# Patient Record
Sex: MALE | Race: WHITE | NOT HISPANIC OR LATINO
[De-identification: names, ages, dates, MRNs, and addresses within clinical notes are randomized per-mention and may not be internally consistent; named-entity substitution may affect disease eponyms.]

---

## 2019-11-20 ENCOUNTER — APPOINTMENT (OUTPATIENT)
Dept: NEUROSURGERY | Facility: CLINIC | Age: 34
End: 2019-11-20
Payer: COMMERCIAL

## 2019-11-20 VITALS
WEIGHT: 170 LBS | RESPIRATION RATE: 18 BRPM | HEIGHT: 67 IN | SYSTOLIC BLOOD PRESSURE: 142 MMHG | DIASTOLIC BLOOD PRESSURE: 79 MMHG | OXYGEN SATURATION: 99 % | HEART RATE: 110 BPM | BODY MASS INDEX: 26.68 KG/M2

## 2019-11-20 DIAGNOSIS — Z78.9 OTHER SPECIFIED HEALTH STATUS: ICD-10-CM

## 2019-11-20 DIAGNOSIS — Z80.9 FAMILY HISTORY OF MALIGNANT NEOPLASM, UNSPECIFIED: ICD-10-CM

## 2019-11-20 PROBLEM — Z00.00 ENCOUNTER FOR PREVENTIVE HEALTH EXAMINATION: Status: ACTIVE | Noted: 2019-11-20

## 2019-11-20 PROCEDURE — 99205 OFFICE O/P NEW HI 60 MIN: CPT

## 2019-11-20 NOTE — HISTORY OF PRESENT ILLNESS
[de-identified] : RIGHT-HANDED never smoker with no pertinent medical history presents to the office, referred by Dr. Nicholas Marie, to evaluate left cheek lesion initially found in 2007 managed by Dr. Vitor Frazier with Prednisone which eliminated the lesion. The lesion resurfaced in August 2019, saw Dr. Marie, who put him back on Prednisone with little effect on the size of the lesion this time. He does not notice a significant size in the lesion on Valsalva maneuver. Denies pain or bleeding at the site.\par \par MRI orbit/face from 10/29/19 reports a 5.3 x 4.6 x 2.4 cm enhancing multilobulated subcutaneous signal abnormality involving the medial LEFT cheek, compatible with lymphatic or venous malformation.\par \par \par \par Handedness: RIGHT\par \par Patient Address:\par [address] [place] [address], [facility] [address]\[place] [address]\par \par Referring MD:\par Dr. Nicholas Marie\par [address]\[place] [address]\par [phone]\par \par PCP:\par Dr. Perry Klein\par [address] [facility], [address]\[place] [address]\par [phone]

## 2019-11-20 NOTE — PHYSICAL EXAM
[General Appearance - Alert] : alert [Oriented To Time, Place, And Person] : oriented to person, place, and time [Impaired Insight] : insight and judgment were intact [Respiration, Rhythm And Depth] : normal respiratory rhythm and effort [] : no respiratory distress [Apical Impulse] : the apical impulse was normal [Heart Rate And Rhythm] : heart rate was normal and rhythm regular [Abnormal Walk] : normal gait [FreeTextEntry1] : LEFT cheek lesion, soft to touch. No bleeding

## 2019-11-20 NOTE — REASON FOR VISIT
[New Patient Visit] : a new patient visit [Referred By: _________] : Patient was referred by FAHAD [FreeTextEntry1] : to evaluate possible lymphatic/venous malformation in the left cheek region

## 2019-11-20 NOTE — END OF VISIT
[FreeTextEntry3] : Written by Oksana Hatfield NP acting as a scribe for Dr. Arias Torres MD.  The documentation recorded by the scribe accurately reflects the service I personally performed and the decisions made by me, Dr. Arias Torres \par \par

## 2019-11-20 NOTE — PLAN
[FreeTextEntry1] : Plan:\par 1. Sclerotherapy with Bleomycin of venous malformation on the left cheek region scheduled on 11/27/19. Advised patient to obtain a PCP clearance and pulmonary function test prior to the procedure. He will call Dr. Sevilla's office to schedule both.

## 2019-12-04 ENCOUNTER — APPOINTMENT (OUTPATIENT)
Dept: HEART AND VASCULAR | Facility: CLINIC | Age: 34
End: 2019-12-04
Payer: COMMERCIAL

## 2019-12-04 VITALS
WEIGHT: 175 LBS | TEMPERATURE: 98.9 F | DIASTOLIC BLOOD PRESSURE: 68 MMHG | HEART RATE: 89 BPM | BODY MASS INDEX: 27.47 KG/M2 | OXYGEN SATURATION: 99 % | SYSTOLIC BLOOD PRESSURE: 110 MMHG | HEIGHT: 67 IN

## 2019-12-04 DIAGNOSIS — Z78.9 OTHER SPECIFIED HEALTH STATUS: ICD-10-CM

## 2019-12-04 DIAGNOSIS — Z01.818 ENCOUNTER FOR OTHER PREPROCEDURAL EXAMINATION: ICD-10-CM

## 2019-12-04 PROCEDURE — 99204 OFFICE O/P NEW MOD 45 MIN: CPT

## 2019-12-04 PROCEDURE — 93000 ELECTROCARDIOGRAM COMPLETE: CPT

## 2019-12-04 RX ORDER — PSYLLIUM HUSK 0.4 G
CAPSULE ORAL
Refills: 0 | Status: ACTIVE | COMMUNITY

## 2019-12-04 NOTE — HISTORY OF PRESENT ILLNESS
[Preoperative Visit] : for a medical evaluation prior to surgery [Scheduled Procedure ___] : a [unfilled] [Date of Surgery ___] : on [unfilled] [Surgeon Name ___] : surgeon: [unfilled] [Electrocardiogram] : ~T an ECG ~C was performed [Good] : Good [Prior Anesthesia] : Prior anesthesia [Steroid Use in Last 6 Months] : steroid use in the last six months [Fever] : no fever [Chills] : no chills [Fatigue] : no fatigue [Chest Pain] : no chest pain [Dyspnea] : no dyspnea [Cough] : no cough [Nausea] : no nausea [Vomiting] : no vomiting [Easy Bruising] : no easy bruising [Poor Exercise Tolerance] : no poor exercise tolerance [Lower Extremity Swelling] : no lower extremity swelling [Cardiovascular Disease] : no cardiovascular disease [Pulmonary Disease] : no pulmonary disease [Diabetes] : no diabetes [Nicotine Dependence] : no nicotine dependence [Alcohol Use] : no  alcohol use [Anti-Platelet Agents] : no anti-platelet agents [GI Disease] : no gastrointestinal disease [Renal Disease] : no renal disease [Thromboembolic Problems] : no thromboembolic problems [Frequent use of NSAIDs] : no use of NSAIDs [Impaired Immunity] : no impaired immunity [Transfusion Reaction] : no transfusion reaction [Prev Anesthesia Reaction] : no previous anesthesia reaction [Anesthesia Reaction] : no anesthesia reaction [Sudden Death] : no sudden death [Bleeding Disorder] : no bleeding disorder [Clotting Disorder] : no clotting disorder [de-identified] : Tel: [phone] [de-identified] : EKG NSR [FreeTextEntry1] : left facial venous/lymphatic malformation.  first noticed in 2007. non tender, no bleeding, no erythema  Drained by ent at the time which made it worse.  Was also put on prednisone at the time which did improve the results.   It had been absent up until ~ 1 month ago.  Took another course of prednisone which ended about a week ago ( ~ 2 week course).  No significant improvement.  MRI showed venous/lymphatic malformation.  \par Exercise tolerance good.  No chest pain or SOB.  \par \par PCP:  Perry Klein, [facility]\par Pulmonary: Dr. Campbell, baseline PFT's given the bleomycin. \par \par Meds: fish oil\par Non smoker\par Social EtOH \par FHX: Grandfather CAD, Aunt Lung cancer

## 2019-12-04 NOTE — DISCUSSION/SUMMARY
[Patient Low Risk] : the patient is a low surgical risk [Optimized for Surgery] : the patient is optimized for surgery [As per surgery] : as per surgery [Continue] : Continue medications as currently directed [Procedure Intermediate Risk] : the procedure risk is intermediate [FreeTextEntry3] : takes fish oil supplements.  completed prednisone taper.  [FreeTextEntry1] : 34 M with no significant PMH, here for preop clearance prior to sclerotherapy for facial venous/lymphatic malformation\par \par RCRI Score 0\par Good functional status\par EKG NSR, no ischemic changes\par No signs/symptoms of ischemia or heart failure. \par No further cardiac testing needed. \par Seen by pulmonary for baseline PFT's prior to low dose bleomycin therapy.\par

## 2019-12-06 ENCOUNTER — OUTPATIENT (OUTPATIENT)
Dept: OUTPATIENT SERVICES | Facility: HOSPITAL | Age: 34
LOS: 1 days | Discharge: HOME | End: 2019-12-06

## 2019-12-06 ENCOUNTER — TRANSCRIPTION ENCOUNTER (OUTPATIENT)
Age: 34
End: 2019-12-06

## 2019-12-06 ENCOUNTER — INPATIENT (INPATIENT)
Facility: HOSPITAL | Age: 34
LOS: 0 days | Discharge: HOME | DRG: 301 | End: 2019-12-07
Attending: RADIOLOGY | Admitting: RADIOLOGY
Payer: COMMERCIAL

## 2019-12-06 VITALS
HEART RATE: 93 BPM | TEMPERATURE: 99 F | RESPIRATION RATE: 16 BRPM | SYSTOLIC BLOOD PRESSURE: 109 MMHG | DIASTOLIC BLOOD PRESSURE: 54 MMHG

## 2019-12-06 DIAGNOSIS — Q27.9 CONGENITAL MALFORMATION OF PERIPHERAL VASCULAR SYSTEM, UNSPECIFIED: ICD-10-CM

## 2019-12-06 PROCEDURE — 37241 VASC EMBOLIZE/OCCLUDE VENOUS: CPT

## 2019-12-06 PROCEDURE — 99222 1ST HOSP IP/OBS MODERATE 55: CPT

## 2019-12-06 RX ORDER — CHLORHEXIDINE GLUCONATE 213 G/1000ML
1 SOLUTION TOPICAL ONCE
Refills: 0 | Status: DISCONTINUED | OUTPATIENT
Start: 2019-12-06 | End: 2019-12-07

## 2019-12-06 RX ORDER — SODIUM CHLORIDE 9 MG/ML
1000 INJECTION INTRAMUSCULAR; INTRAVENOUS; SUBCUTANEOUS
Refills: 0 | Status: DISCONTINUED | OUTPATIENT
Start: 2019-12-06 | End: 2019-12-06

## 2019-12-06 RX ORDER — BLEOMYCIN SULFATE 30 UNIT
15 VIAL (EA) INJECTION ONCE
Refills: 0 | Status: DISCONTINUED | OUTPATIENT
Start: 2019-12-06 | End: 2019-12-07

## 2019-12-06 RX ORDER — ACETAMINOPHEN 500 MG
650 TABLET ORAL EVERY 6 HOURS
Refills: 0 | Status: DISCONTINUED | OUTPATIENT
Start: 2019-12-06 | End: 2019-12-07

## 2019-12-06 RX ADMIN — SODIUM CHLORIDE 75 MILLILITER(S): 9 INJECTION INTRAMUSCULAR; INTRAVENOUS; SUBCUTANEOUS at 13:48

## 2019-12-06 NOTE — PROGRESS NOTE ADULT - SUBJECTIVE AND OBJECTIVE BOX
Surgery: Endovascular microcatheter sclerotherapy of L cheek venous malformation with bleomycin    Consent: Signed by patient     HPI:  35 yo male presents today for elective sclerotherapy treatment of L cheek venous malformation. Patient reports that the vascular anomaly was first discovered in 2007 when he noticed swelling of his L cheek. He underwent a biopsy and started a course of steroids, resulting in a decrease in size of the vascular malformation. Patient reports that a few weeks ago, he noticed an increase in the size of the malformation due to L cheek swelling. He started another course of steroids and finished it last week, with little to no improvement in the swelling. He denies any history of symptoms such as itching, vision changes, changes in hearing/taste/smell, HA, weakness of lower extremities, numbness/tingling of extremities, urinary/bowel incontinence.     PMH:  no significant PMH    Surgical history:  [place] tooth extraction    Medications:  none    No Known Allergies      EXAM:  AAO x 3, NAD  L cheek swelling  Speech clear, follows commands  CN II-XII intact, PERRL, EOMI, face symmetric, tongue midline  PENA, b/l UE/LE strength 5/5  Sensation intact to light touch  No pronator drift  b/l DP/PT pulses 3+      Type & Screen (in past 72hrs): N/A    CXR: none  EKG: sinus rhythm (12/4/19)      Last dose of antiplatelet/anticoagulation drug: none      Assessment: 35 yo male with L cheek vascular malformation    Plan: NEUROENDOVASCULAR PRE-OPERATIVE NOTE    Surgery: Endovascular microcatheter sclerotherapy of L cheek venous malformation with bleomycin    Consent: Signed by patient     HPI:  35 yo male presents today for elective sclerotherapy treatment of L cheek venous malformation. Patient reports that the vascular anomaly was first discovered in 2007 when he noticed swelling of his L cheek. He underwent a biopsy and started a course of steroids, resulting in a decrease in size of the vascular malformation. Patient reports that a few weeks ago, he noticed an increase in the size of the malformation due to L cheek swelling. He started another course of steroids and finished it last week, with little to no improvement in the swelling. He denies any history of symptoms such as itching, vision changes, changes in hearing/taste/smell, HA, weakness of lower extremities, numbness/tingling of extremities, urinary/bowel incontinence.     PMH:  no significant PMH    Surgical history:  [place] tooth extraction    Medications:  none    No Known Allergies      EXAM:  AAO x 3, NAD  L cheek swelling  Speech clear, follows commands  CN II-XII intact, PERRL, EOMI, face symmetric, tongue midline  PENA, b/l UE/LE strength 5/5  Sensation intact to light touch  No pronator drift  b/l DP/PT pulses 3+      Type & Screen (in past 72hrs): N/A    CXR: none  EKG: sinus rhythm (12/4/19)      Last dose of antiplatelet/anticoagulation drug: none      Assessment: 35 yo male with L cheek vascular malformation    Plan:  Plan for sclerotherapy L cheek vascular malformation with bleomycin  Consent in chart, discussed all risks/benefits/alternatives with patient.  Medical clearance reviewed, medically optimized  Plan for discharge tomorrow   D/w Dr. Torres

## 2019-12-06 NOTE — BRIEF OPERATIVE NOTE - OPERATION/FINDINGS
Endovascular microcatheter sclerotherapy using 6mL of Bleomycin for left cheek venous malformation performed under general sedation. Patient tolerated procedure well, extubated, and remains neurologically and hemodynamically stable.    Full report to follow, d/w Dr. Torres

## 2019-12-06 NOTE — PROGRESS NOTE ADULT - SUBJECTIVE AND OBJECTIVE BOX
Pt was examined at the bedside, pain is well controlled, pt denies sob, cp, difficulty swallowing.    12/6 POD#0 s/p Sclerotherapy with Bleomycin  of low flow vascular malformation  Left buccal area    ICU Vital Signs Last 24 Hrs  T(C): 37.1 (06 Dec 2019 12:34), Max: 37.1 (06 Dec 2019 12:34)  T(F): 98.7 (06 Dec 2019 12:34), Max: 98.7 (06 Dec 2019 12:34)  HR: 82 (06 Dec 2019 15:15) (80 - 94)  BP: 108/54 (06 Dec 2019 15:15) (95/55 - 118/64)  BP(mean): 94 (06 Dec 2019 14:35) (68 - 94)  ABP: --  ABP(mean): --  RR: 20 (06 Dec 2019 15:15) (10 - 20)  SpO2: 97% (06 Dec 2019 15:15) (96% - 99%)    Exam:  A&OX3, NAD, clear coherent speech  CNs II-XII grossly intact  Left buccal area edematous   motor 5/5 x 4 extr, no drift,   sensation to LT grossly intact throughout      A/P  Pt is 33 yo male s/p Sclerotherapy with Bleomycin  of low flow vascular malformation  Left buccal area POD#0 (12/6)  - Bleomycin protocol for 48hrs, nurse was instructed  - OOB  - Advance diet as tolerated  - Pain Meds PRN  - SCDs, IS  - Tentatively scheduled for discharge home tomorrow  - D/w Dr. Torres 5 yo male presents today for elective sclerotherapy treatment of L cheek venous malformation with Bleomycin. Patient reports that the vascular anomaly was first discovered in 2007 when he noticed swelling of his L cheek. He underwent a biopsy and started a course of steroids, resulting in a decrease in size of the vascular malformation. Patient reports that a few weeks ago, he noticed an increase in the size of the malformation due to L cheek swelling. He started another course of steroids and finished it last week, with little to no improvement in the swelling. He denies any history of symptoms such as itching, vision changes, changes in hearing/taste/smell, HA, weakness of lower extremities, numbness/tingling of extremities, urinary/bowel incontinence.       Pt was examined at the bedside, pain is well controlled, pt denies sob, cp, difficulty swallowing.    12/6 POD#0 s/p Sclerotherapy with Bleomycin  of low flow vascular malformation  Left buccal area    ICU Vital Signs Last 24 Hrs  T(C): 37.1 (06 Dec 2019 12:34), Max: 37.1 (06 Dec 2019 12:34)  T(F): 98.7 (06 Dec 2019 12:34), Max: 98.7 (06 Dec 2019 12:34)  HR: 82 (06 Dec 2019 15:15) (80 - 94)  BP: 108/54 (06 Dec 2019 15:15) (95/55 - 118/64)  BP(mean): 94 (06 Dec 2019 14:35) (68 - 94)  ABP: --  ABP(mean): --  RR: 20 (06 Dec 2019 15:15) (10 - 20)  SpO2: 97% (06 Dec 2019 15:15) (96% - 99%)    Exam:  A&OX3, NAD, clear coherent speech  CNs II-XII grossly intact  Left buccal area edematous   motor 5/5 x 4 extr, no drift,   sensation to LT grossly intact throughout      A/P  Pt is 35 yo male s/p Sclerotherapy with Bleomycin  of low flow vascular malformation  Left buccal area POD#0 (12/6)  - Bleomycin protocol for 48hrs, nurse was instructed  - OOB  - Advance diet as tolerated  - Pain Meds PRN  - SCDs, IS  - Tentatively scheduled for discharge home tomorrow  - D/w Dr. Torres

## 2019-12-06 NOTE — H&P ADULT - PROBLEM SELECTOR PLAN 1
Plan for sclerotherapy L cheek vascular malformation with bleomycin  Consent in chart, discussed all risks/benefits/alternatives with patient.  Medical clearance reviewed, medically optimized  Plan for discharge tomorrow   D/w Dr. Torres

## 2019-12-06 NOTE — H&P ADULT - HISTORY OF PRESENT ILLNESS
35 yo male presents today for elective sclerotherapy treatment of L cheek venous malformation with Bleomycin. Patient reports that the vascular anomaly was first discovered in 2007 when he noticed swelling of his L cheek. He underwent a biopsy and started a course of steroids, resulting in a decrease in size of the vascular malformation. Patient reports that a few weeks ago, he noticed an increase in the size of the malformation due to L cheek swelling. He started another course of steroids and finished it last week, with little to no improvement in the swelling. He denies any history of symptoms such as itching, vision changes, changes in hearing/taste/smell, HA, weakness of lower extremities, numbness/tingling of extremities, urinary/bowel incontinence.

## 2019-12-06 NOTE — H&P ADULT - ASSESSMENT
33 yo male with L cheek vascular malformation 35 yo male with L cheek vascular malformation known since 2007 with prior resolution with steroid therapy  now with recurrence, asymptomatic.

## 2019-12-06 NOTE — CHART NOTE - NSCHARTNOTEFT_GEN_A_CORE
Neurosurgical Indications for Screening Dopplers on Admission:       1) Known hypercoagulation disorder (h/o VTE, thrombophilia, HIT, etc.)   2) Admitted from prolonged stay from another institution (straight forward ER transfers not included)  3) Presenting with significant leg immobility   4) Presenting with signs and symptoms of VTE?    5) With significant critical illness, Including "found down" for unknown period of time in HPI  6) With significant neurotrauma (TBI, SCI / TLS spine fractures)   7) Who are comatose   8) With known malignancy (e.g. glioblastoma multiforme, meningioma, etc.). Excludes IA chemo 23hr observation stays  9) On hemodialysis   10) Who have received platelet transfusion or antithrombotic reversal agents recently   11) Who have had recent major orthopedic surgery          Screening dopplers indicated?   Y _   N x    DVT Prophylaxis:  x SCD's   _ chemoprophylaxis

## 2019-12-06 NOTE — PACU DISCHARGE NOTE - COMMENTS
Patient meets criteria for patient discharge, VSS, #20 gauge IV to left A/C patent and intact receiving 0.9%NS @ 75ml/hr, Patient drinking water and eating crackers with no nausea, moving all extremities with normal strength, No Pain reported, No SOB, No chest pain, skin intact, Left cheek swollen with pink/red color, Report given to nathaniel on 8 [facility], patient transported via bed on monitor.

## 2019-12-06 NOTE — H&P ADULT - NSHPPHYSICALEXAM_GEN_ALL_CORE
AAO x 3, NAD  L cheek swelling  Speech clear, follows commands  CN II-XII intact, PERRL, EOMI, face symmetric, tongue midline  PENA, b/l UE/LE strength 5/5  Sensation intact to light touch  No pronator drift  b/l DP/PT pulses 3+

## 2019-12-07 ENCOUNTER — TRANSCRIPTION ENCOUNTER (OUTPATIENT)
Age: 34
End: 2019-12-07

## 2019-12-07 VITALS — TEMPERATURE: 98 F

## 2019-12-07 PROCEDURE — 99238 HOSP IP/OBS DSCHRG MGMT 30/<: CPT

## 2019-12-07 RX ORDER — ACETAMINOPHEN 500 MG
2 TABLET ORAL
Qty: 0 | Refills: 0 | DISCHARGE
Start: 2019-12-07

## 2019-12-07 NOTE — PROGRESS NOTE ADULT - REASON FOR ADMISSION
Sclerotherapy of L cheek venous malformation

## 2019-12-07 NOTE — DISCHARGE NOTE PROVIDER - NSDCCPTREATMENT_GEN_ALL_CORE_FT
PRINCIPAL PROCEDURE  Procedure: Sclerotherapy of low flow vascular malformation  Findings and Treatment:

## 2019-12-07 NOTE — DISCHARGE NOTE PROVIDER - HOSPITAL COURSE
HPI:    35 yo male presents today for elective sclerotherapy treatment of L cheek venous malformation with Bleomycin. Patient reports that the vascular anomaly was first discovered in 2007 when he noticed swelling of his L cheek. He underwent a biopsy and started a course of steroids, resulting in a decrease in size of the vascular malformation. Patient reports that a few weeks ago, he noticed an increase in the size of the malformation due to L cheek swelling. He started another course of steroids and finished it last week, with little to no improvement in the swelling. He denies any history of symptoms such as itching, vision changes, changes in hearing/taste/smell, HA, weakness of lower extremities, numbness/tingling of extremities, urinary/bowel incontinence. (06 Dec 2019 16:13)        Hospital Course:    12/6: POD#0 s/p Sclerotherapy with Bleomycin of low flow vascular malformation  Left buccal area    12/7: POD#1: ROSA overnight, neuro stable, DC to home today. Bleomycin precautions x 48 hours

## 2019-12-07 NOTE — DISCHARGE NOTE PROVIDER - NSDCFUADDINST_GEN_ALL_CORE_FT
Bleomycin precautions x 48 hours. No adhesives on or off of skin x 48 hours.  Follow up in the office with Dr. Torres: [phone]

## 2019-12-07 NOTE — DISCHARGE NOTE PROVIDER - CARE PROVIDER_API CALL
Arias Torres)  Neurology; Vascular Neurology  [address] [place] [address]  Phone: [phone]  Fax: [phone]  Follow Up Time:

## 2019-12-07 NOTE — DISCHARGE NOTE PROVIDER - NSDCFUSCHEDAPPT_GEN_ALL_CORE_FT
KATHY CASTRO ; 12/11/2019 ; [facility] [address] [place] [address] KATHY CASTRO ; 12/11/2019 ; [facility] [address] [place] [address]

## 2019-12-07 NOTE — PROGRESS NOTE ADULT - SUBJECTIVE AND OBJECTIVE BOX
HPI:  33 yo male presents today for elective sclerotherapy treatment of L cheek venous malformation with Bleomycin. Patient reports that the vascular anomaly was first discovered in 2007 when he noticed swelling of his L cheek. He underwent a biopsy and started a course of steroids, resulting in a decrease in size of the vascular malformation. Patient reports that a few weeks ago, he noticed an increase in the size of the malformation due to L cheek swelling. He started another course of steroids and finished it last week, with little to no improvement in the swelling. He denies any history of symptoms such as itching, vision changes, changes in hearing/taste/smell, HA, weakness of lower extremities, numbness/tingling of extremities, urinary/bowel incontinence. (06 Dec 2019 16:13)    Hospital Course:  12/6:  POD#0 s/p Sclerotherapy with Bleomycin  of low flow vascular malformation  Left buccal area  12/7: ROSA overnight, neuro stable, DC to home today    Vital Signs Last 24 Hrs  T(C): 36.7 (07 Dec 2019 05:03), Max: 37.2 (06 Dec 2019 16:15)  T(F): 98.1 (07 Dec 2019 05:03), Max: 98.9 (06 Dec 2019 16:15)  HR: 70 (07 Dec 2019 04:15) (66 - 94)  BP: 106/62 (07 Dec 2019 04:15) (95/55 - 118/64)  BP(mean): 78 (07 Dec 2019 04:15) (68 - 94)  RR: 18 (07 Dec 2019 04:15) (10 - 20)  SpO2: 100% (06 Dec 2019 17:03) (95% - 100%)    I&O's Summary    06 Dec 2019 07:01  -  07 Dec 2019 05:37  --------------------------------------------------------  IN: 750 mL / OUT: 1105 mL / NET: -355 mL        PHYSICAL EXAM:  Neuro: A&OX3, NAD, clear coherent speech  CNs II-XII grossly intact  Left buccal area edematous   motor 5/5 x 4 extr, no drift,   sensation to LT grossly intact throughout    TUBES/LINES:  [] Mcdaniel  [] Lumbar Drain  [] Wound Drains  [] Others      DIET:  [] NPO  [x] Mechanical  [] Tube feeds    LABS:                  CAPILLARY BLOOD GLUCOSE          Drug Levels: [] N/A    CSF Analysis: [] N/A      Allergies    No Known Allergies    Intolerances      MEDICATIONS:  Antibiotics:    Neuro:  acetaminophen   Tablet .. 650 milliGRAM(s) Oral every 6 hours PRN    Anticoagulation:    OTHER:  bleomycin Injectable (eMAR) 15 Unit(s) IntraLesional Once  chlorhexidine 4% Liquid 1 Application(s) Topical once    IVF:    CULTURES:    RADIOLOGY & ADDITIONAL TESTS:      ASSESSMENT:  Pt is 33 yo male s/p Sclerotherapy with Bleomycin  of low flow vascular malformation  Left buccal area POD#1 (12/6)    PLAN:  - neuro checks  - vitals checks  - pain control  - Bleomycin protocol for 48 hours  - regular diet  - bowel regimen   - DVT PROPHYLAXIS: [x] Venodynes [] Heparin/Lovenox    DISPOSITION: stepdown status, full code, discharge to home today    d/w Dr. Torres    Assessment:  Present when checked    []  GCS  E   V  M     Heart Failure: []Acute, [] acute on chronic , []chronic  Heart Failure:  [] Diastolic (HFpEF), [] Systolic (HFrEF), []Combined (HFpEF and HFrEF), [] RHF, [] Pulm HTN, [] Other    [] ROGER, [] ATN, [] AIN, [] other  [] CKD1, [] CKD2, [] CKD 3, [] CKD 4, [] CKD 5, []ESRD    Encephalopathy: [] Metabolic, [] Hepatic, [] toxic, [] Neurological, [] Other    Abnormal Nurtitional Status: [] malnurtition (see nutrition note), [ ]underweight: BMI < 19, [] morbid obesity: BMI >40, [] Cachexia    [] Sepsis  [] hypovolemic shock,[] cardiogenic shock, [] hemorrhagic shock, [] neuogenic shock  [] Acute Respiratory Failure  []Cerebral edema, [] Brain compression/ herniation,   [] Functional quadriplegia  [] Acute blood loss anemia

## 2019-12-11 ENCOUNTER — APPOINTMENT (OUTPATIENT)
Dept: NEUROSURGERY | Facility: CLINIC | Age: 34
End: 2019-12-11
Payer: COMMERCIAL

## 2019-12-11 VITALS
HEIGHT: 67 IN | SYSTOLIC BLOOD PRESSURE: 126 MMHG | TEMPERATURE: 98.4 F | DIASTOLIC BLOOD PRESSURE: 85 MMHG | HEART RATE: 98 BPM | WEIGHT: 175 LBS | RESPIRATION RATE: 18 BRPM | OXYGEN SATURATION: 99 % | BODY MASS INDEX: 27.47 KG/M2

## 2019-12-11 DIAGNOSIS — Q27.9 CONGENITAL MALFORMATION OF PERIPHERAL VASCULAR SYSTEM, UNSPECIFIED: ICD-10-CM

## 2019-12-11 DIAGNOSIS — Q89.9 CONGENITAL MALFORMATION, UNSPECIFIED: ICD-10-CM

## 2019-12-11 DIAGNOSIS — I89.8 OTHER SPECIFIED NONINFECTIVE DISORDERS OF LYMPHATIC VESSELS AND LYMPH NODES: ICD-10-CM

## 2019-12-11 PROCEDURE — 99213 OFFICE O/P EST LOW 20 MIN: CPT

## 2019-12-11 NOTE — REASON FOR VISIT
[Post Hospitalization] : a post hospitalization visit [FreeTextEntry1] : s/p endovascular microcatheter sclerotherapy of venous-lymphatic malformation of the LEFT facial region using 15 mg of Bleomycin by Dr. Torres on 12/6/19.\par \par \par TODAY'S VISIT:\par He presents to the office for a post hospitalization follow up. He is doing well. Denies pain and bleeding at the sclerotherapy site. Swelling still noted on the left cheek venous-LM area.

## 2019-12-11 NOTE — PHYSICAL EXAM
[General Appearance - Alert] : alert [General Appearance - In No Acute Distress] : in no acute distress [Clean] : clean [Intact] : intact [Dry] : dry [Impaired Insight] : insight and judgment were intact [Person] : oriented to person [Oriented To Time, Place, And Person] : oriented to person, place, and time [Affect] : the affect was normal [Time] : oriented to time [Place] : oriented to place [Abnormal Walk] : normal gait [Neck Appearance] : the appearance of the neck was normal [Exaggerated Use Of Accessory Muscles For Inspiration] : no accessory muscle use [] : no respiratory distress [Respiration, Rhythm And Depth] : normal respiratory rhythm and effort [Heart Rate And Rhythm] : heart rate was normal and rhythm regular [Apical Impulse] : the apical impulse was normal [FreeTextEntry1] : LEFT facial swelling, mild redness

## 2019-12-11 NOTE — SURGICAL HISTORY
[] : Yes [de-identified] :  endovascular microcatheter sclerotherapy of venous-lymphatic malformation of the LEFT facial region

## 2019-12-11 NOTE — ASSESSMENT
[FreeTextEntry1] : Patient is doing well 5 days s/p sclerotherapy of venous-lymphatic malformation of LEFT facial region using Bleomycin. Will repeat MRI orbit/face 9 weeks from the time of treatment and will see us back in the office to review the result. Patient is requesting open MRI 2/2 to anxiety.\par \par Plan:\par 1. MRI of the orbital region w/wo contrast (open MRI) in 9 weeks from time of sclerotherapy. Pt was educated not to obtain the MRI earlier than 9 weeks to assess for maximum post sclerotherapy result.

## 2019-12-11 NOTE — HISTORY OF PRESENT ILLNESS
[FreeTextEntry1] : RIGHT-HANDED never smoker with no pertinent medical history presented to the office, referred by Dr. Nicholas Marie, to evaluate left cheek lesion initially found in 2007 managed by Dr. Vitor Frazier with Prednisone which eliminated the lesion. The lesion resurfaced in August 2019, saw Dr. Marie, who put him back on Prednisone with little effect on the size of the lesion this time. He does not notice a significant size in the lesion on Valsalva maneuver. 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 Torres on 12/6/19.\par \par \par \par Handedness: RIGHT\par \par Patient Address:\par [address] [place] [address], [facility] [address]\[place] [address]\par \par Referring MD:\par Dr. Nicholas Marie\par [address]\[place] [address]\par [phone]\par \par PCP:\par Dr. Perry Klein\par [address]\[place] [address]\par [phone] \par  \par \par \par \par \par \par \par

## 2020-01-17 PROCEDURE — C1889: CPT

## 2020-01-17 PROCEDURE — C1725: CPT

## 2020-01-17 PROCEDURE — C9399: CPT

## 2020-01-17 PROCEDURE — C1769: CPT

## 2020-01-17 PROCEDURE — C1760: CPT

## 2020-02-07 PROBLEM — Z78.9 OTHER SPECIFIED HEALTH STATUS: Chronic | Status: ACTIVE | Noted: 2019-12-06

## 2020-02-20 ENCOUNTER — APPOINTMENT (OUTPATIENT)
Dept: NEUROSURGERY | Facility: CLINIC | Age: 35
End: 2020-02-20
Payer: COMMERCIAL

## 2020-02-20 VITALS
SYSTOLIC BLOOD PRESSURE: 126 MMHG | HEART RATE: 77 BPM | BODY MASS INDEX: 26.68 KG/M2 | WEIGHT: 170 LBS | RESPIRATION RATE: 18 BRPM | HEIGHT: 67 IN | DIASTOLIC BLOOD PRESSURE: 77 MMHG | OXYGEN SATURATION: 99 %

## 2020-02-20 DIAGNOSIS — Q27.9 CONGENITAL MALFORMATION OF PERIPHERAL VASCULAR SYSTEM, UNSPECIFIED: ICD-10-CM

## 2020-02-20 PROCEDURE — 99214 OFFICE O/P EST MOD 30 MIN: CPT

## 2020-02-20 NOTE — REASON FOR VISIT
[FreeTextEntry1] : TODAY'S VISIT:\par Patient doing well. He reports reduction in size of the left cheek venous-lymphatic malformation. He denies furether swelling, bleeding or pain at the site.

## 2020-02-20 NOTE — ASSESSMENT
[FreeTextEntry1] : Today we reviewed the MRI from February 18, 2020 that demonstrated almost complete obliteration of the treated vascular malformation.  There is a small residual in the left nasolabial fold.  MRI reports enlarged bilateral level 2 lynph nodes.\par \par We will continue conservative follow up.  We will obtain a follow up MRI in 3 years and he will follow up in Dr. Torres's office at that time. Patient was advised to follow up with his PCP regarding the enlarged lymph nodes.\par \par Plan:\par 1. Call the office if there is more growth on the left cheek area, otherwise repeat MRI face in 3 years.\par 2. Follow up with PCP regarding MRI findings of 2 mildly enlarged lymph nodes.

## 2020-02-20 NOTE — ADDENDUM
[FreeTextEntry1] : Dear Dr. Marie:\par \par We saw Rio in our office at [facility].  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  He originally noticed swelling approximately 12 years ago.  At that time he underwent a biopsy that was hemorrhagic and was treated with steroids for presumed lymphatic malformation.  There was significant decrease in the size of the malformation until earlier this year when he noticed the swelling again.  There was no significant change with Valsalva maneuvers.\par \par We had reviewed the MRIs from 2007 and from October 2019.  The lesion was hyperintense on T2, didn’t enhance with contrast in 2007 but it enhanced in the most recent study.  \par \par We performed endovascular microcatheter sclerotherapy with 6 mg of Bleomycin on December 6, 2020.  The procedure and recovery were unremarkable.  He has no new complaints.  There has been decrease in the swelling on the left side of the face.\par \par Today we reviewed the MRI from February 18, 2020 that demonstrated almost complete obliteration of the treated vascular malformation.  There is a small residual in the left nasolabial fold.  \par \par We will continue conservative follow up.  We will obtain a follow up MRI in 3 years and he will follow up in my office at that time.\par \par Thank you for allowing us to participate in Rio’s care.  I will keep you updated.\par \par Sincerely,\par \par \par Arias Torres MD\par Chief\par Neuro-Endovascular Surgery and \par Interventional Neuroradiology \par [facility]\par [facility]\par [address]\par [address] Floor\[place] [address]\par T:  [phone]\par F:  [phone]\par \par

## 2020-02-20 NOTE — HISTORY OF PRESENT ILLNESS
[FreeTextEntry1] : RIGHT-HANDED never smoker with no pertinent medical history presented to the office, referred by Dr. Nicholas Marie, to evaluate left cheek lesion initially found in 2007 managed by Dr. Vitor Frazier with Prednisone which eliminated the lesion. The lesion resurfaced in August 2019, saw Dr. Marie, who put him back on Prednisone with little effect on the size of the lesion this time. He does not notice a significant size in the lesion on Valsalva maneuver. 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 Torres on 12/6/19.\par \par \par \par Handedness: RIGHT\par \par Patient Address:\par [address] [place] [address], [facility] [address]\[place] [address]\par \par Referring MD:\par Dr. Nicholas Marie\par [address]\[place] [address]\par [phone]\par \par PCP:\par Dr. Perry Klein\par [address]\[place] [address]\par [phone] \par  \par \par \par \par \par \par \par

## 2020-02-20 NOTE — PHYSICAL EXAM
[General Appearance - Alert] : alert [Person] : oriented to person [Oriented To Time, Place, And Person] : oriented to person, place, and time [General Appearance - In No Acute Distress] : in no acute distress [Place] : oriented to place [Time] : oriented to time [Balance] : balance was intact [Abnormal Walk] : normal gait [Sclera] : the sclera and conjunctiva were normal [PERRL With Normal Accommodation] : pupils were equal in size, round, reactive to light, with normal accommodation [Respiration, Rhythm And Depth] : normal respiratory rhythm and effort [] : no respiratory distress [Apical Impulse] : the apical impulse was normal [Heart Rate And Rhythm] : heart rate was normal and rhythm regular [FreeTextEntry1] : no facial swelling

## 2020-02-20 NOTE — DATA REVIEWED
[de-identified] : MRI orbits/face/neck from 2/18/2020: nearly completely resolved, prior, left premaxillary subcutaneous soft tissue. Mildly enlarged, bilateral level 2 lymph nodes.

## 2020-03-20 NOTE — DISCHARGE NOTE NURSING/CASE MANAGEMENT/SOCIAL WORK - PATIENT PORTAL LINK FT
You can access the FollowMyHealth Patient Portal offered by [facility] by registering at the following website: [email or web address]. By joining [facility]’s FollowMyHealth portal, you will also be able to view your health information using other applications (apps) compatible with our system. SSKI Counseling:  I discussed with the patient the risks of SSKI including but not limited to thyroid abnormalities, metallic taste, GI upset, fever, headache, acne, arthralgias, paraesthesias, lymphadenopathy, easy bleeding, arrhythmias, and allergic reaction.

## 2020-07-18 ENCOUNTER — TRANSCRIPTION ENCOUNTER (OUTPATIENT)
Age: 35
End: 2020-07-18